# Patient Record
(demographics unavailable — no encounter records)

---

## 2025-02-27 NOTE — PHYSICAL EXAM
[de-identified] : AVSS  Gen: NAD, AOx3  Right Upper Extremity  2x2 cm well circumcised palpable hard cyst palpated over the dorsal carpal SL junction Mild ttp  Painless ROM  AIN/PIN/U/Med/R intact to motor  SILT M/R/U/Ax  Palpable radial and ulnar pulses  Brisk cap refill distally  Compartments soft and compressible  [de-identified] : R wrist - Xray Neg for fracture or any osseous abnormalaties

## 2025-02-27 NOTE — PROCEDURE
[Aspiration] : Aspiration [Right] : on the right.   [Ganglion Cyst Of Small Joint___] : [unfilled] ganglion cyst [Patient] : patient [Risk] : Risk [Benefits] : benefits [Alcohol] : Alcohol [___mL] : [unfilled] ~UmL of lidocaine [Direct] : direct [22] : a 22-gauge [___ mL Fluid] : [unfilled] mL of [Yellow] : yellow [Bandage Applied] : a bandage [Tolerated Well] : The patient tolerated the procedure well [None] : None

## 2025-02-27 NOTE — HISTORY OF PRESENT ILLNESS
[de-identified] : 33 M presenting w/ ganglion cyst. Pt endorses cyst over the dorsal aspect of the right of the R hand for the past 2 years, however also endorses that he has had R wrist pain for over 5 years now.  Pt reports that he has tried injecting a needle, and applying downward force over the cyst w/ no improvement. Pt here today for resolution of cyst.

## 2025-02-27 NOTE — DISCUSSION/SUMMARY
[de-identified] : 33 M w/ R wrist ganglion cyst s/p bedside aspiration w/ Dr. Hayes -F/u if symptoms not resolved -MRI if no improvement at next visit  Discussed w/ Dr. Hayes

## 2025-02-27 NOTE — ASSESSMENT
[FreeTextEntry1] : All medical record entries made by "residents name" acting as a scribe for this encounter under the direction of "Dr. Hayes." I have reviewed the chart and agree that the record accurately reflects my personal performance of the history, physical exam, assessment and plan. I have also personally directed, reviewed and agreed with the chart.